# Patient Record
Sex: FEMALE | Race: WHITE | NOT HISPANIC OR LATINO | URBAN - METROPOLITAN AREA
[De-identification: names, ages, dates, MRNs, and addresses within clinical notes are randomized per-mention and may not be internally consistent; named-entity substitution may affect disease eponyms.]

---

## 2017-09-18 ENCOUNTER — APPOINTMENT (RX ONLY)
Dept: URBAN - METROPOLITAN AREA CLINIC 137 | Facility: CLINIC | Age: 37
Setting detail: DERMATOLOGY
End: 2017-09-18

## 2018-06-21 ENCOUNTER — APPOINTMENT (RX ONLY)
Dept: URBAN - METROPOLITAN AREA CLINIC 137 | Facility: CLINIC | Age: 38
Setting detail: DERMATOLOGY
End: 2018-06-21

## 2018-06-21 DIAGNOSIS — Z41.9 ENCOUNTER FOR PROCEDURE FOR PURPOSES OTHER THAN REMEDYING HEALTH STATE, UNSPECIFIED: ICD-10-CM

## 2018-06-21 PROCEDURE — ? XEOMIN (U OR CC)

## 2018-06-21 PROCEDURE — ? DYSPORT (U OR CC)

## 2018-06-21 NOTE — PROCEDURE: XEOMIN (U OR CC)
Inferior Lateral Masseter Units: 0
Dilution (U/0.1 Cc): 5
Detail Level: Detailed
Consent: Written consent obtained. Risks include but not limited to lid/brow ptosis, bruising, swelling, diplopia, temporary effect, incomplete chemical denervation.
Glabellar Complex Units: 3
Reconstitution Date (Optional): 05/14/2018
Post-Care Instructions: Patient instructed to not lie down for 4 hours and limit physical activity for 24 hours. Patient instructed not to travel by airplane for 48 hours.
Periorbital Skin Units: 20
Lot #: [id number]
Price (Use Numbers Only, No Special Characters Or $): 0.00
Additional Area 1 Location: Bunny Lines
Inferior Lateral Orbicularis Oculi Units: 1
Expiration Date (Month Year): 03/2019
Inferior Lateral Orbicularis Oculi Units: 4

## 2018-06-21 NOTE — PROCEDURE: DYSPORT (U OR CC)
Additional Area 1 Location: Bunny Lines
Additional Area 2 Units: 0
Glabellar Complex Units: 42
Post-Care Instructions: Patient instructed to not lie down for 4 hours and limit physical activity for 24 hours. Patient instructed not to travel by airplane for 48 hours.
Price (Use Numbers Only, No Special Characters Or $): 350.00
Forehead Units: 20
Dilution (U/0.1 Cc): 10
Expiration Date (Month Year): 07/31/2018
Detail Level: Detailed
Consent: Written consent obtained. Risks include but not limited to lid/brow ptosis, bruising, swelling, diplopia, temporary effect, incomplete chemical denervation.
Lot #: [id number]
Reconstitution Date (Optional): 20-6/14/18;42.5-6/20/2018

## 2019-04-04 ENCOUNTER — APPOINTMENT (RX ONLY)
Dept: URBAN - METROPOLITAN AREA CLINIC 137 | Facility: CLINIC | Age: 39
Setting detail: DERMATOLOGY
End: 2019-04-04

## 2019-04-04 DIAGNOSIS — Z41.9 ENCOUNTER FOR PROCEDURE FOR PURPOSES OTHER THAN REMEDYING HEALTH STATE, UNSPECIFIED: ICD-10-CM

## 2019-04-04 PROCEDURE — ? DYSPORT (U OR CC)

## 2019-04-04 NOTE — PROCEDURE: DYSPORT (U OR CC)
Nasal Root Units: 0
Forehead Units: 21
Reconstitution Date (Optional): 10.5 units 4/3/19; 139 units 3/28/19
Detail Level: Detailed
Dilution (U/0.1 Cc): 10
Inferior Lateral Orbicularis Oculi Units: 13
Additional Area 1 Location: Bunny Lines
Post-Care Instructions: Patient instructed to not lie down for 4 hours and limit physical activity for 24 hours. Patient instructed not to travel by airplane for 48 hours.
Consent: Written consent obtained. Risks include but not limited to lid/brow ptosis, bruising, swelling, diplopia, temporary effect, incomplete chemical denervation.
Lot #: [id number]
Glabellar Complex Units: 63
Periorbital Skin Units: 52
Price (Use Numbers Only, No Special Characters Or $): 890.74
Additional Area 2 Location: Perioral
Expiration Date (Month Year): 08/31/2019